# Patient Record
Sex: FEMALE | Race: BLACK OR AFRICAN AMERICAN | NOT HISPANIC OR LATINO | Employment: FULL TIME | URBAN - METROPOLITAN AREA
[De-identification: names, ages, dates, MRNs, and addresses within clinical notes are randomized per-mention and may not be internally consistent; named-entity substitution may affect disease eponyms.]

---

## 2021-04-06 ENCOUNTER — IMMUNIZATION (OUTPATIENT)
Dept: PRIMARY CARE CLINIC | Facility: CLINIC | Age: 31
End: 2021-04-06

## 2021-04-06 DIAGNOSIS — Z23 NEED FOR VACCINATION: Primary | ICD-10-CM

## 2021-04-06 PROCEDURE — 0001A COVID-19, MRNA, LNP-S, PF, 30 MCG/0.3 ML DOSE VACCINE: ICD-10-PCS | Mod: CV19,S$GLB,, | Performed by: FAMILY MEDICINE

## 2021-04-06 PROCEDURE — 0001A COVID-19, MRNA, LNP-S, PF, 30 MCG/0.3 ML DOSE VACCINE: CPT | Mod: CV19,S$GLB,, | Performed by: FAMILY MEDICINE

## 2021-04-06 PROCEDURE — 91300 COVID-19, MRNA, LNP-S, PF, 30 MCG/0.3 ML DOSE VACCINE: ICD-10-PCS | Mod: S$GLB,,, | Performed by: FAMILY MEDICINE

## 2021-04-06 PROCEDURE — 91300 COVID-19, MRNA, LNP-S, PF, 30 MCG/0.3 ML DOSE VACCINE: CPT | Mod: S$GLB,,, | Performed by: FAMILY MEDICINE

## 2021-04-27 ENCOUNTER — IMMUNIZATION (OUTPATIENT)
Dept: PRIMARY CARE CLINIC | Facility: CLINIC | Age: 31
End: 2021-04-27

## 2021-04-27 DIAGNOSIS — Z23 NEED FOR VACCINATION: Primary | ICD-10-CM

## 2021-04-27 PROCEDURE — 91300 COVID-19, MRNA, LNP-S, PF, 30 MCG/0.3 ML DOSE VACCINE: CPT | Mod: S$GLB,,, | Performed by: FAMILY MEDICINE

## 2021-04-27 PROCEDURE — 0002A COVID-19, MRNA, LNP-S, PF, 30 MCG/0.3 ML DOSE VACCINE: ICD-10-PCS | Mod: CV19,S$GLB,, | Performed by: FAMILY MEDICINE

## 2021-04-27 PROCEDURE — 0002A COVID-19, MRNA, LNP-S, PF, 30 MCG/0.3 ML DOSE VACCINE: CPT | Mod: CV19,S$GLB,, | Performed by: FAMILY MEDICINE

## 2021-04-27 PROCEDURE — 91300 COVID-19, MRNA, LNP-S, PF, 30 MCG/0.3 ML DOSE VACCINE: ICD-10-PCS | Mod: S$GLB,,, | Performed by: FAMILY MEDICINE

## 2022-08-06 ENCOUNTER — OFFICE VISIT (OUTPATIENT)
Dept: URGENT CARE | Facility: CLINIC | Age: 32
End: 2022-08-06
Payer: COMMERCIAL

## 2022-08-06 VITALS
WEIGHT: 293 LBS | TEMPERATURE: 101 F | HEART RATE: 102 BPM | DIASTOLIC BLOOD PRESSURE: 83 MMHG | SYSTOLIC BLOOD PRESSURE: 136 MMHG | RESPIRATION RATE: 16 BRPM | HEIGHT: 70 IN | OXYGEN SATURATION: 96 % | BODY MASS INDEX: 41.95 KG/M2

## 2022-08-06 DIAGNOSIS — Z20.2 STD EXPOSURE: ICD-10-CM

## 2022-08-06 DIAGNOSIS — Z20.822 SUSPECTED COVID-19 VIRUS INFECTION: ICD-10-CM

## 2022-08-06 DIAGNOSIS — J02.9 SORE THROAT: Primary | ICD-10-CM

## 2022-08-06 DIAGNOSIS — J01.90 ACUTE BACTERIAL SINUSITIS: ICD-10-CM

## 2022-08-06 DIAGNOSIS — J02.9 PHARYNGITIS, UNSPECIFIED ETIOLOGY: ICD-10-CM

## 2022-08-06 DIAGNOSIS — R51.9 SINUS HEADACHE: ICD-10-CM

## 2022-08-06 DIAGNOSIS — B96.89 ACUTE BACTERIAL SINUSITIS: ICD-10-CM

## 2022-08-06 LAB
CTP QC/QA: YES
CTP QC/QA: YES
MOLECULAR STREP A: NEGATIVE
SARS-COV-2 RDRP RESP QL NAA+PROBE: NEGATIVE

## 2022-08-06 PROCEDURE — 87651 POCT STREP A MOLECULAR: ICD-10-PCS | Mod: QW,S$GLB,, | Performed by: NURSE PRACTITIONER

## 2022-08-06 PROCEDURE — 87147 CULTURE TYPE IMMUNOLOGIC: CPT | Performed by: NURSE PRACTITIONER

## 2022-08-06 PROCEDURE — 87070 CULTURE OTHR SPECIMN AEROBIC: CPT | Performed by: NURSE PRACTITIONER

## 2022-08-06 PROCEDURE — U0002 COVID-19 LAB TEST NON-CDC: HCPCS | Mod: QW,S$GLB,, | Performed by: NURSE PRACTITIONER

## 2022-08-06 PROCEDURE — 99204 OFFICE O/P NEW MOD 45 MIN: CPT | Mod: S$GLB,,, | Performed by: NURSE PRACTITIONER

## 2022-08-06 PROCEDURE — 87651 STREP A DNA AMP PROBE: CPT | Mod: QW,S$GLB,, | Performed by: NURSE PRACTITIONER

## 2022-08-06 PROCEDURE — U0002: ICD-10-PCS | Mod: QW,S$GLB,, | Performed by: NURSE PRACTITIONER

## 2022-08-06 PROCEDURE — 99204 PR OFFICE/OUTPT VISIT, NEW, LEVL IV, 45-59 MIN: ICD-10-PCS | Mod: S$GLB,,, | Performed by: NURSE PRACTITIONER

## 2022-08-06 RX ORDER — FLUTICASONE PROPIONATE 50 MCG
2 SPRAY, SUSPENSION (ML) NASAL DAILY
Qty: 16 G | Refills: 1 | Status: SHIPPED | OUTPATIENT
Start: 2022-08-06 | End: 2022-08-06

## 2022-08-06 RX ORDER — BENZONATATE 100 MG/1
200 CAPSULE ORAL 3 TIMES DAILY PRN
Qty: 60 CAPSULE | Refills: 1 | Status: SHIPPED | OUTPATIENT
Start: 2022-08-06 | End: 2023-08-01

## 2022-08-06 RX ORDER — DOXYCYCLINE 100 MG/1
100 CAPSULE ORAL EVERY 12 HOURS
Qty: 20 CAPSULE | Refills: 0 | Status: SHIPPED | OUTPATIENT
Start: 2022-08-06 | End: 2022-08-06

## 2022-08-06 RX ORDER — FLUTICASONE PROPIONATE 50 MCG
2 SPRAY, SUSPENSION (ML) NASAL DAILY
Qty: 16 G | Refills: 1 | Status: SHIPPED | OUTPATIENT
Start: 2022-08-06 | End: 2023-08-01

## 2022-08-06 RX ORDER — DOXYCYCLINE 100 MG/1
100 CAPSULE ORAL EVERY 12 HOURS
Qty: 20 CAPSULE | Refills: 0 | Status: SHIPPED | OUTPATIENT
Start: 2022-08-06 | End: 2022-08-16

## 2022-08-06 RX ORDER — BENZONATATE 100 MG/1
200 CAPSULE ORAL 3 TIMES DAILY PRN
Qty: 60 CAPSULE | Refills: 1 | Status: SHIPPED | OUTPATIENT
Start: 2022-08-06 | End: 2022-08-06

## 2022-08-06 NOTE — PROGRESS NOTES
"Subjective:       Patient ID: Drew Livingston is a 31 y.o. female.    Vitals:  height is 5' 10" (1.778 m) and weight is 147.4 kg (325 lb) (abnormal). Her temperature is 101.2 °F (38.4 °C) (abnormal). Her blood pressure is 136/83 and her pulse is 102. Her respiration is 16 and oxygen saturation is 96%.     Chief Complaint: Sore Throat    Pt started with symptoms 08/05/22 sore throat,fever,fatigue,headaches Pt has not taken anything to treat symptoms.    Sore Throat   This is a new problem. The current episode started yesterday. The problem has been unchanged. The maximum temperature recorded prior to her arrival was 101 - 101.9 F. The pain is at a severity of 5/10. Associated symptoms include headaches, a hoarse voice and trouble swallowing. Pertinent negatives include no abdominal pain, congestion, coughing, diarrhea, drooling, ear discharge, ear pain, plugged ear sensation, neck pain, shortness of breath, stridor, swollen glands or vomiting. She has tried nothing for the symptoms. The treatment provided no relief.       HENT: Positive for sore throat and trouble swallowing. Negative for ear pain, ear discharge, drooling and congestion.    Neck: Negative for neck pain.   Respiratory: Negative for cough, shortness of breath and stridor.    Gastrointestinal: Negative for abdominal pain, vomiting and diarrhea.   Neurological: Positive for headaches.         CHIEF COMPLAINT/REASON FOR VISIT:  runny nose, nasal congestion, postnasal drip, fever with body aches     HISTORY OF PRESENT ILLNESS:   31  year-old  Obese female  complains of runny nose, nasal congestion,  Postnasal drip,fever with body aches,  headache onset 2-3 days.  Patient concerned regarding possible COVID and strep, requesting COVID and strep screen.  Patient concerned regarding possible STD exposure.  Patient admits had oral sex recently and wanting to be checked for an STD. Patient admits tried over-the-counter medications with no relief. Denies " chest pain, shortness of breath, dizziness, blurred vision, nausea, vomiting, diarrhea, loss of appetite.     History reviewed. No pertinent past medical history.      .History reviewed. No pertinent surgical history.      Social History     Socioeconomic History    Marital status: Single   Tobacco Use    Smoking status: Never Smoker    Smokeless tobacco: Never Used   Substance and Sexual Activity    Alcohol use: Not Currently    Drug use: Not Currently    Sexual activity: Not Currently       History reviewed. No pertinent family history.      ROS:  GENERAL: fever, chills with body aches  SKIN: No rashes, itching or changes in color or texture of skin.   HEENT:  Reports   Possible STD exposure, Sore throat, runny nose, nasal congestion, headache  NODES: No masses or lesions. Denies swollen glands.   CHEST: reports cough   CARDIOVASCULAR: Denies chest pain, shortness of breath.  ABDOMEN: Appetite fine. No weight loss. Denies diarrhea, abdominal pain  MUSCULOSKELETAL: No joint stiffness or swelling. Denies back pain.  NEUROLOGIC: No history of seizures, paralysis, alteration of gait or coordination.  PSYCHIATRIC: Denies mood swings, depression or suicidal thoughts.    PE:   APPEARANCE: Well nourished, well developed, in mild distress.  Temp 101.2°, pulse ox 96%  V/S: Reviewed.  SKIN: Normal skin turgor, no lesions.  HEENT: Turbinates injected, minimal red pharynx. TM's poor light reflex bilateral,  Minimal facial tenderness.  CHEST: Lungs clear to auscultation. No wheezing  CARDIOVASCULAR: Regular rate and rhythm.  NEUROLOGIC: No sensory deficits. Gait & Posture: Normal, No cerebellar signs.  MENTAL STATUS: Patient alert, oriented x 3 & conversant.    PLAN: Lab work POCT rapid Covid 19 screen/ negative, POCT  Rapid strep screen/ negative,  Culture, gonococcus,  Culture   Aerobic/ oral cavity  Advise increase p.o. fluids--water/juice & rest  Meds:  Doxycycline, Flonase and  Tessalon Perles  / no refills  Simply  saline nasal wash to irrigate sinuses and for congestion/runny nose.  Cool mist humidifier/vaporizer.  Practice good handwashing.  Advise use of green tea with honey  Tylenol or Ibuprofen for fever, headache and body aches.  Warm salt water gargles for throat comfort.  Chloraseptic spray or lozenges for throat comfort.  Advise follow up with PCP in 2-3 days for recheck  Advise go to ER if symptoms worsen or fail to improve with treatment.  Instructions, follow up, and supportive care as per AVS.  AVS provided and reviewed with patient including supportive care, follow up, and red flag symptoms.   Patient verbalizes understanding and agrees with treatment plan. Discharged from Urgent Care in stable condition.  You have tested negative for COVID-19 today. If you did not have any close exposure as defined below, then effective today, you can return to your normal daily activities including social distancing, wearing masks, and frequent handwashing.    DIAGNOSIS:   pharyngitis   sinus headache  Flu like symptoms   COVID-19 exposure   STD exposure   acute bacterial sinusitis

## 2022-08-06 NOTE — PATIENT INSTRUCTIONS
PLAN: Lab work POCT rapid Covid 19 screen/ negative, POCT  Rapid strep screen/ negative,  Culture, gonococcus  Advise increase p.o. fluids--water/juice & rest  Meds:  Doxycycline, Flonase and  Tessalon Perles  / no refills  Simply saline nasal wash to irrigate sinuses and for congestion/runny nose.  Cool mist humidifier/vaporizer.  Practice good handwashing.  Advise use of green tea with honey  Tylenol or Ibuprofen for fever, headache and body aches.  Warm salt water gargles for throat comfort.  Chloraseptic spray or lozenges for throat comfort.  Advise follow up with PCP in 2-3 days for recheck  Advise go to ER if symptoms worsen or fail to improve with treatment.  Instructions, follow up, and supportive care as per AVS.  AVS provided and reviewed with patient including supportive care, follow up, and red flag symptoms.   Patient verbalizes understanding and agrees with treatment plan. Discharged from Urgent Care in stable condition.  You have tested negative for COVID-19 today. If you did not have any close exposure as defined below, then effective today, you can return to your normal daily activities including social distancing, wearing masks, and frequent handwashing.

## 2022-08-09 LAB — BACTERIA SPEC AEROBE CULT: ABNORMAL

## 2022-08-10 ENCOUNTER — TELEPHONE (OUTPATIENT)
Dept: URGENT CARE | Facility: CLINIC | Age: 32
End: 2022-08-10
Payer: COMMERCIAL

## 2022-08-10 DIAGNOSIS — A49.1 GROUP C STREPTOCOCCAL INFECTION: Primary | ICD-10-CM

## 2022-08-10 RX ORDER — CEFDINIR 300 MG/1
300 CAPSULE ORAL 2 TIMES DAILY
Qty: 14 CAPSULE | Refills: 0 | Status: SHIPPED | OUTPATIENT
Start: 2022-08-10 | End: 2022-08-10

## 2022-08-10 RX ORDER — CEFDINIR 300 MG/1
300 CAPSULE ORAL 2 TIMES DAILY
Qty: 14 CAPSULE | Refills: 0 | Status: SHIPPED | OUTPATIENT
Start: 2022-08-10 | End: 2022-08-17
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
## 2023-02-04 ENCOUNTER — OFFICE VISIT (OUTPATIENT)
Dept: URGENT CARE | Facility: CLINIC | Age: 33
End: 2023-02-04
Payer: COMMERCIAL

## 2023-02-04 VITALS
TEMPERATURE: 98 F | HEIGHT: 70 IN | WEIGHT: 293 LBS | DIASTOLIC BLOOD PRESSURE: 74 MMHG | SYSTOLIC BLOOD PRESSURE: 134 MMHG | OXYGEN SATURATION: 99 % | BODY MASS INDEX: 41.95 KG/M2 | HEART RATE: 85 BPM

## 2023-02-04 DIAGNOSIS — K64.4 EXTERNAL HEMORRHOIDS: Primary | ICD-10-CM

## 2023-02-04 PROCEDURE — 3078F DIAST BP <80 MM HG: CPT | Mod: CPTII,S$GLB,,

## 2023-02-04 PROCEDURE — 1160F RVW MEDS BY RX/DR IN RCRD: CPT | Mod: CPTII,S$GLB,,

## 2023-02-04 PROCEDURE — 3078F PR MOST RECENT DIASTOLIC BLOOD PRESSURE < 80 MM HG: ICD-10-PCS | Mod: CPTII,S$GLB,,

## 2023-02-04 PROCEDURE — 99203 PR OFFICE/OUTPT VISIT, NEW, LEVL III, 30-44 MIN: ICD-10-PCS | Mod: S$GLB,,,

## 2023-02-04 PROCEDURE — 1159F PR MEDICATION LIST DOCUMENTED IN MEDICAL RECORD: ICD-10-PCS | Mod: CPTII,S$GLB,,

## 2023-02-04 PROCEDURE — 99203 OFFICE O/P NEW LOW 30 MIN: CPT | Mod: S$GLB,,,

## 2023-02-04 PROCEDURE — 3008F PR BODY MASS INDEX (BMI) DOCUMENTED: ICD-10-PCS | Mod: CPTII,S$GLB,,

## 2023-02-04 PROCEDURE — 3075F PR MOST RECENT SYSTOLIC BLOOD PRESS GE 130-139MM HG: ICD-10-PCS | Mod: CPTII,S$GLB,,

## 2023-02-04 PROCEDURE — 3008F BODY MASS INDEX DOCD: CPT | Mod: CPTII,S$GLB,,

## 2023-02-04 PROCEDURE — 1159F MED LIST DOCD IN RCRD: CPT | Mod: CPTII,S$GLB,,

## 2023-02-04 PROCEDURE — 3075F SYST BP GE 130 - 139MM HG: CPT | Mod: CPTII,S$GLB,,

## 2023-02-04 PROCEDURE — 1160F PR REVIEW ALL MEDS BY PRESCRIBER/CLIN PHARMACIST DOCUMENTED: ICD-10-PCS | Mod: CPTII,S$GLB,,

## 2023-02-04 RX ORDER — ESTRADIOL 0.5 MG/1
0.5 TABLET ORAL DAILY
COMMUNITY

## 2023-02-04 RX ORDER — LIDOCAINE HYDROCHLORIDE AND HYDROCORTISONE ACETATE 30; 5 MG/G; MG/G
1 CREAM TOPICAL 2 TIMES DAILY PRN
Qty: 28.3 G | Refills: 0 | Status: CANCELLED | OUTPATIENT
Start: 2023-02-04 | End: 2023-02-11

## 2023-02-04 RX ORDER — HYDROCORTISONE 1 %
CREAM (GRAM) TOPICAL 2 TIMES DAILY
Qty: 26 G | Refills: 0 | Status: SHIPPED | OUTPATIENT
Start: 2023-02-04 | End: 2023-02-04

## 2023-02-04 RX ORDER — SPIRONOLACTONE 50 MG/1
50 TABLET, FILM COATED ORAL DAILY
COMMUNITY

## 2023-02-04 RX ORDER — LIDOCAINE HYDROCHLORIDE AND HYDROCORTISONE ACETATE 30; 5 MG/G; MG/G
1 CREAM TOPICAL 2 TIMES DAILY PRN
Qty: 28.3 G | Refills: 0 | Status: SHIPPED | OUTPATIENT
Start: 2023-02-04 | End: 2023-02-11

## 2023-02-04 NOTE — PATIENT INSTRUCTIONS
Do not use prescribed gel for more than 7 days.  Sitz baths as needed.  Be aware of bleeding.   Increase fiber in diet to loosen stools.  Drink plenty of fluids.  You must understand that you have received Urgent Care treatment only and that you may be released before all of your medical problems are known or treated.   Return to Urgent Care or go to ER if symptoms worsen or fail to improve.  Follow up with PCP as recommended for further management.   We will notify you of any test results (if applicable) in 3-5 days.

## 2023-02-04 NOTE — PROGRESS NOTES
"Subjective:       Patient ID: Drew Livingston is a 32 y.o. female.    Chief Complaint:  Anal Itching      HPI:      Outpatient Medications Marked as Taking for the 2/4/23 encounter (Office Visit) with PROVIDER, URGENT CARE, [facility]   Medication Sig Dispense Refill    estradioL (ESTRACE) 0.5 MG tablet Take 0.5 mg by mouth once daily.      spironolactone (ALDACTONE) 50 MG tablet Take 50 mg by mouth once daily.          Patient has no known allergies.     History reviewed. No pertinent past medical history.    History reviewed. No pertinent family history.    Environmental History: {environmental history:[id number]}     Review of Systems    Objective:     Visit Vitals  /74 (BP Location: Left arm, Patient Position: Sitting)   Pulse 85   Temp 98.2 °F (36.8 °C)   Ht 5' 10" (1.778 m)   Wt (!) 146.1 kg (322 lb)   SpO2 99%   BMI 46.20 kg/m²       Physical Exam      Assessment:      No diagnosis found.    Plan:         "

## 2023-02-04 NOTE — PROGRESS NOTES
"Subjective:       Patient ID: Drew Livingston is a 32 y.o. female.    Vitals:  height is 5' 10" (1.778 m) and weight is 146.1 kg (322 lb) (abnormal). Her temperature is 98.2 °F (36.8 °C). Her blood pressure is 134/74 and her pulse is 85. Her oxygen saturation is 99%.     Chief Complaint: Anal Itching    Drew Livingston is a 32 y.o. female who presents for anal itching which onset 3 weeks ago. No associated sxs included. She denies any pain. Denies any mitigating or exacerbating sxs. Symptoms are not worse at night. No rectal bleeding. No hematochezia. Patient denies any fever, chills, SOB, CP, abd pain, n/v/d, rash, dizziness, or numbness/tingling.    Other  This is a new problem. The current episode started 1 to 4 weeks ago. The problem occurs 2 to 4 times per day. The problem has been unchanged. Pertinent negatives include no abdominal pain, anorexia, arthralgias, change in bowel habit, chest pain, chills, congestion, coughing, diaphoresis, fatigue, fever, headaches, joint swelling, myalgias, nausea, neck pain, numbness, rash, sore throat, swollen glands, urinary symptoms, vertigo, visual change, vomiting or weakness. Nothing aggravates the symptoms. She has tried nothing for the symptoms.     Constitution: Negative for chills, sweating, fatigue and fever.   HENT:  Negative for congestion and sore throat.    Neck: Negative for neck pain.   Cardiovascular:  Negative for chest pain.   Respiratory:  Negative for cough.    Gastrointestinal:  Negative for abdominal pain, nausea, vomiting, constipation, bright red blood in stool, rectal bleeding, rectal pain and hemorrhoids.        (+) rectal itching   Musculoskeletal:  Negative for joint pain, joint swelling and muscle ache.   Skin:  Negative for rash.   Neurological:  Negative for history of vertigo, headaches and numbness.     Objective:      Physical Exam   Pulmonary/Chest: Effort normal.   Abdominal: Normal appearance. flat abdomen   Genitourinary:          "      Comments: 2 external non-thrombosed hemorrhoids at the anterior midline and 10 o'clock position. No fissures noted. No verrucous places. No vesicles. No discharge. No bleeding.      Musculoskeletal: Normal range of motion.         General: Normal range of motion.   Neurological: no focal deficit. She is alert.   Skin: Skin is warm and dry.   Psychiatric: Mood and thought content normal.   Nursing note and vitals reviewed.chaperone present         Assessment:       1. External hemorrhoids          Plan:         External hemorrhoids  -     LIDOcaine HCl-hydrocortison ac (LIDAMANTLE-HC) 3-0.5 % topical cream; Apply 1 g topically 2 (two) times daily as needed (for itching/pain).  Dispense: 28.3 g; Refill: 0    Non-thrombosed external hemorrhoids.  Afebrile. VSS.  Meds: lidocaine/hydrocortisone with aloe topical cream sent to preferred pharmacy. Instructed patient to not use prescribed medication for more than 7 days.  Sitz baths as needed.  Be aware of any bleeding.   Increase fiber in diet to soften stools.  Increase fluid intake.  RTC for any worsening symptoms.

## 2023-04-04 ENCOUNTER — HOSPITAL ENCOUNTER (EMERGENCY)
Facility: HOSPITAL | Age: 33
Discharge: HOME | End: 2023-04-04
Attending: EMERGENCY MEDICINE
Payer: COMMERCIAL

## 2023-04-04 VITALS
OXYGEN SATURATION: 98 % | WEIGHT: 293 LBS | TEMPERATURE: 98 F | HEIGHT: 70 IN | SYSTOLIC BLOOD PRESSURE: 142 MMHG | DIASTOLIC BLOOD PRESSURE: 88 MMHG | HEART RATE: 74 BPM | BODY MASS INDEX: 41.95 KG/M2 | RESPIRATION RATE: 16 BRPM

## 2023-04-04 DIAGNOSIS — Z71.89 ENCOUNTER FOR HIV SCREENING AND DISCUSSION OF PRE-EXPOSURE PROPHYLAXIS FOR HIV: ICD-10-CM

## 2023-04-04 DIAGNOSIS — Z11.4 ENCOUNTER FOR HIV SCREENING AND DISCUSSION OF PRE-EXPOSURE PROPHYLAXIS FOR HIV: ICD-10-CM

## 2023-04-04 DIAGNOSIS — Z20.2 ENCOUNTER FOR ASSESSMENT OF STD EXPOSURE: ICD-10-CM

## 2023-04-04 DIAGNOSIS — T74.21XA SEXUAL ASSAULT OF ADULT, INITIAL ENCOUNTER: Primary | ICD-10-CM

## 2023-04-04 LAB
BILIRUB UR QL STRIP: NEGATIVE
CLARITY UR: CLEAR
COLOR UR: YELLOW
GLUCOSE UR QL STRIP: NEGATIVE
HCV AB SERPL QL IA: NEGATIVE
HEP C VIRUS HOLD SPECIMEN: NORMAL
HGB UR QL STRIP: NEGATIVE
HIV 1+2 AB+HIV1 P24 AG SERPL QL IA: NEGATIVE
KETONES UR QL STRIP: NEGATIVE
LEUKOCYTE ESTERASE UR QL STRIP: NEGATIVE
NITRITE UR QL STRIP: NEGATIVE
PH UR STRIP: 7 [PH] (ref 5–8)
PROT UR QL STRIP: NEGATIVE
SP GR UR STRIP: 1.02 (ref 1–1.03)
URN SPEC COLLECT METH UR: NORMAL
UROBILINOGEN UR STRIP-ACNC: NEGATIVE EU/DL

## 2023-04-04 PROCEDURE — 87591 N.GONORRHOEAE DNA AMP PROB: CPT | Performed by: EMERGENCY MEDICINE

## 2023-04-04 PROCEDURE — 81003 URINALYSIS AUTO W/O SCOPE: CPT | Performed by: EMERGENCY MEDICINE

## 2023-04-04 PROCEDURE — 87389 HIV-1 AG W/HIV-1&-2 AB AG IA: CPT | Performed by: EMERGENCY MEDICINE

## 2023-04-04 PROCEDURE — 63600175 PHARM REV CODE 636 W HCPCS: Performed by: EMERGENCY MEDICINE

## 2023-04-04 PROCEDURE — 99284 EMERGENCY DEPT VISIT MOD MDM: CPT

## 2023-04-04 PROCEDURE — 96372 THER/PROPH/DIAG INJ SC/IM: CPT | Performed by: EMERGENCY MEDICINE

## 2023-04-04 PROCEDURE — 86803 HEPATITIS C AB TEST: CPT | Performed by: EMERGENCY MEDICINE

## 2023-04-04 RX ORDER — DOXYCYCLINE 100 MG/1
100 CAPSULE ORAL 2 TIMES DAILY
Qty: 14 CAPSULE | Refills: 0 | Status: SHIPPED | OUTPATIENT
Start: 2023-04-04 | End: 2023-04-04 | Stop reason: SDUPTHER

## 2023-04-04 RX ORDER — EMTRICITABINE AND TENOFOVIR DISOPROXIL FUMARATE 200; 300 MG/1; MG/1
1 TABLET, FILM COATED ORAL DAILY
Qty: 30 TABLET | Refills: 0 | Status: SHIPPED | OUTPATIENT
Start: 2023-04-04 | End: 2023-08-01

## 2023-04-04 RX ORDER — DOXYCYCLINE 100 MG/1
100 CAPSULE ORAL 2 TIMES DAILY
Qty: 14 CAPSULE | Refills: 0 | Status: SHIPPED | OUTPATIENT
Start: 2023-04-04 | End: 2023-04-11

## 2023-04-04 RX ORDER — METRONIDAZOLE 500 MG/1
500 TABLET ORAL EVERY 12 HOURS
Qty: 14 TABLET | Refills: 0 | Status: SHIPPED | OUTPATIENT
Start: 2023-04-04 | End: 2023-04-11

## 2023-04-04 RX ORDER — METRONIDAZOLE 500 MG/1
500 TABLET ORAL EVERY 12 HOURS
Qty: 14 TABLET | Refills: 0 | Status: SHIPPED | OUTPATIENT
Start: 2023-04-04 | End: 2023-04-04 | Stop reason: SDUPTHER

## 2023-04-04 RX ORDER — CEFTRIAXONE 500 MG/1
500 INJECTION, POWDER, FOR SOLUTION INTRAMUSCULAR; INTRAVENOUS
Status: COMPLETED | OUTPATIENT
Start: 2023-04-04 | End: 2023-04-04

## 2023-04-04 RX ORDER — EMTRICITABINE AND TENOFOVIR DISOPROXIL FUMARATE 200; 300 MG/1; MG/1
1 TABLET, FILM COATED ORAL DAILY
Qty: 30 TABLET | Refills: 0 | Status: SHIPPED | OUTPATIENT
Start: 2023-04-04 | End: 2023-04-04 | Stop reason: SDUPTHER

## 2023-04-04 RX ADMIN — CEFTRIAXONE SODIUM 500 MG: 500 INJECTION, POWDER, FOR SOLUTION INTRAMUSCULAR; INTRAVENOUS at 01:04
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
## 2023-04-05 LAB
C TRACH DNA SPEC QL NAA+PROBE: NOT DETECTED
N GONORRHOEA DNA SPEC QL NAA+PROBE: NOT DETECTED

## 2023-04-24 ENCOUNTER — NURSE TRIAGE (OUTPATIENT)
Dept: ADMINISTRATIVE | Facility: CLINIC | Age: 33
End: 2023-04-24
Payer: COMMERCIAL

## 2023-04-24 NOTE — TELEPHONE ENCOUNTER
Pt took Vit D 50,000 IU  on Saturday then took another dose today. Medication is suppose to be taken weekly. PT stated it was prescribed by Md at [facility]. Pt denies any other symptoms. Care advice recommend call is transferred to pcp. Pt offered and accepted [facility].   Reason for Disposition   DOUBLE DOSE (an extra dose or lesser amount) of prescription drug and NO symptoms  (Exception: A double dose of antibiotics.)    Additional Information   Negative: Intentional drug overdose and suicidal thoughts or ideas   Negative: MORE THAN A DOUBLE DOSE of a prescription or over-the-counter (OTC) drug   Negative: DOUBLE DOSE (an extra dose or lesser amount) of prescription drug and any symptoms (e.g., dizziness, nausea, pain, sleepiness)   Negative: DOUBLE DOSE (an extra dose or lesser amount) of over-the-counter (OTC) drug and any symptoms (e.g., dizziness, nausea, pain, sleepiness)   Negative: Took another person's prescription drug    Protocols used: Medication Question Call-A-OH

## 2023-06-21 ENCOUNTER — OFFICE VISIT (OUTPATIENT)
Dept: URGENT CARE | Facility: CLINIC | Age: 33
End: 2023-06-21
Payer: COMMERCIAL

## 2023-06-21 VITALS
DIASTOLIC BLOOD PRESSURE: 62 MMHG | HEART RATE: 68 BPM | TEMPERATURE: 99 F | RESPIRATION RATE: 18 BRPM | OXYGEN SATURATION: 99 % | WEIGHT: 293 LBS | SYSTOLIC BLOOD PRESSURE: 128 MMHG | BODY MASS INDEX: 41.95 KG/M2 | HEIGHT: 70 IN

## 2023-06-21 DIAGNOSIS — B37.2 CANDIDAL SKIN INFECTION: Primary | ICD-10-CM

## 2023-06-21 PROCEDURE — 99213 PR OFFICE/OUTPT VISIT, EST, LEVL III, 20-29 MIN: ICD-10-PCS | Mod: S$GLB,,, | Performed by: NURSE PRACTITIONER

## 2023-06-21 PROCEDURE — 99213 OFFICE O/P EST LOW 20 MIN: CPT | Mod: S$GLB,,, | Performed by: NURSE PRACTITIONER

## 2023-06-21 RX ORDER — FLUCONAZOLE 150 MG/1
150 TABLET ORAL DAILY
Qty: 1 TABLET | Refills: 0 | Status: SHIPPED | OUTPATIENT
Start: 2023-06-21 | End: 2023-06-22

## 2023-06-21 RX ORDER — CLOTRIMAZOLE 1 %
CREAM (GRAM) TOPICAL 2 TIMES DAILY
Qty: 28 G | Refills: 0 | Status: SHIPPED | OUTPATIENT
Start: 2023-06-21 | End: 2023-08-30
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
## 2023-06-21 NOTE — PATIENT INSTRUCTIONS
Apply topical antifungal as prescribed.  Take diflucan and prescribed.  Continue to use good hygiene practices.  Return to clinic or follow up with primary care provider for worsening of any symptoms.

## 2023-06-24 ENCOUNTER — TELEPHONE (OUTPATIENT)
Dept: URGENT CARE | Facility: CLINIC | Age: 33
End: 2023-06-24
Payer: COMMERCIAL

## 2023-08-01 ENCOUNTER — LAB VISIT (OUTPATIENT)
Dept: LAB | Facility: HOSPITAL | Age: 33
End: 2023-08-01
Attending: NURSE PRACTITIONER
Payer: COMMERCIAL

## 2023-08-01 ENCOUNTER — OFFICE VISIT (OUTPATIENT)
Dept: URGENT CARE | Facility: CLINIC | Age: 33
End: 2023-08-01
Payer: COMMERCIAL

## 2023-08-01 VITALS
WEIGHT: 293 LBS | HEIGHT: 70 IN | RESPIRATION RATE: 18 BRPM | DIASTOLIC BLOOD PRESSURE: 79 MMHG | TEMPERATURE: 99 F | HEART RATE: 94 BPM | SYSTOLIC BLOOD PRESSURE: 149 MMHG | OXYGEN SATURATION: 100 % | BODY MASS INDEX: 41.95 KG/M2

## 2023-08-01 DIAGNOSIS — K13.21 LEUKOPLAKIA OF TONGUE: ICD-10-CM

## 2023-08-01 DIAGNOSIS — K13.21 LEUKOPLAKIA OF TONGUE: Primary | ICD-10-CM

## 2023-08-01 DIAGNOSIS — Z11.3 SCREENING FOR STD (SEXUALLY TRANSMITTED DISEASE): ICD-10-CM

## 2023-08-01 DIAGNOSIS — B37.0 ORAL THRUSH: ICD-10-CM

## 2023-08-01 PROCEDURE — 99214 PR OFFICE/OUTPT VISIT, EST, LEVL IV, 30-39 MIN: ICD-10-PCS | Mod: S$GLB,,, | Performed by: NURSE PRACTITIONER

## 2023-08-01 PROCEDURE — 86592 SYPHILIS TEST NON-TREP QUAL: CPT | Performed by: NURSE PRACTITIONER

## 2023-08-01 PROCEDURE — 87389 HIV-1 AG W/HIV-1&-2 AB AG IA: CPT | Performed by: NURSE PRACTITIONER

## 2023-08-01 PROCEDURE — 36415 COLL VENOUS BLD VENIPUNCTURE: CPT | Mod: PO | Performed by: NURSE PRACTITIONER

## 2023-08-01 PROCEDURE — 99214 OFFICE O/P EST MOD 30 MIN: CPT | Mod: S$GLB,,, | Performed by: NURSE PRACTITIONER

## 2023-08-01 RX ORDER — NYSTATIN 100000 [USP'U]/ML
5 SUSPENSION ORAL 4 TIMES DAILY
Qty: 160 ML | Refills: 0 | Status: SHIPPED | OUTPATIENT
Start: 2023-08-01 | End: 2023-08-15

## 2023-08-01 NOTE — PROGRESS NOTES
"Subjective:      Patient ID: Drew Livingston is a 32 y.o. female.    Vitals:  height is 5' 10" (1.778 m) and weight is 135.2 kg (298 lb). Her blood pressure is 149/79 (abnormal) and her pulse is 94. Her respiration is 18 and oxygen saturation is 100%.     Chief Complaint: Lip Laceration    C/o a bump on tongue, 1 week, Pt states she used a new toothpaste recently, pt states the symptoms has happened before but went away    Other  This is a new problem. The current episode started in the past 7 days. The problem occurs constantly. The problem has been unchanged. Pertinent negatives include no abdominal pain, anorexia, arthralgias, change in bowel habit, chest pain, chills, congestion, coughing, diaphoresis, fatigue, headaches, joint swelling, myalgias, nausea, neck pain, numbness, rash, sore throat, swollen glands, urinary symptoms, vertigo, visual change, vomiting or weakness. Nothing aggravates the symptoms. She has tried nothing for the symptoms. The treatment provided no relief.     Constitution: Negative for chills, sweating and fatigue.   HENT:  Negative for congestion and sore throat.    Neck: Negative for neck pain.   Cardiovascular:  Negative for chest pain.   Respiratory:  Negative for cough.    Gastrointestinal:  Negative for abdominal pain, nausea and vomiting.   Musculoskeletal:  Negative for joint pain, joint swelling and muscle ache.   Skin:  Negative for rash.   Neurological:  Negative for history of vertigo, headaches and numbness.    Objective:     Physical Exam    Assessment:     No diagnosis found.    Plan:       There are no diagnoses linked to this encounter.                "

## 2023-08-01 NOTE — PROGRESS NOTES
"Subjective:      Patient ID: Drew Livingston is a 32 y.o. female.    Vitals:  height is 5' 10" (1.778 m) and weight is 135.2 kg (298 lb). Her tympanic temperature is 99.1 °F (37.3 °C). Her blood pressure is 149/79 (abnormal) and her pulse is 94. Her respiration is 18 and oxygen saturation is 100%.     Chief Complaint: Bump on Lip    Drew Livingston is a 32 year old female whom presents to urgent care with complaints of  a "bump" on her tongue. Patient reports the lesion has been present for approximately 2 week. Patient reports it is not painful but is causing a great deal of concern. Patient reports the only new thing she has tried is a new toothpaste but reports there is no correlation between the time the toothpaste was started and appearance of the lesion. Patient states this has happened previously in the past but reports the lesion went away on its own without any treatment.  Patient reports prior dental work approximately one month ago. Patient denies any family history of oral cancer. Patient does report increased life stressors at this time.     Other  This is a new problem. The current episode started in the past 7 days. The problem occurs constantly. The problem has been unchanged. Pertinent negatives include no abdominal pain, anorexia, arthralgias, change in bowel habit, chest pain, chills, congestion, coughing, diaphoresis, fatigue, headaches, joint swelling, myalgias, nausea, neck pain, numbness, rash, sore throat, swollen glands, urinary symptoms, vertigo, visual change, vomiting or weakness. Nothing aggravates the symptoms. She has tried nothing for the symptoms. The treatment provided no relief.       Constitution: Negative for chills, sweating and fatigue.   HENT:  Negative for congestion and sore throat.    Neck: Negative for neck pain.   Cardiovascular:  Negative for chest pain.   Respiratory:  Negative for cough.    Gastrointestinal:  Negative for abdominal pain, nausea and vomiting. "   Musculoskeletal:  Negative for joint pain, joint swelling and muscle ache.   Skin:  Negative for rash.   Neurological:  Negative for history of vertigo, headaches and numbness.      Objective:     Physical Exam   HENT:   Head: Normocephalic and atraumatic.   Ears:   Right Ear: External ear normal.   Left Ear: External ear normal.   Nose: Nose normal.   Mouth/Throat: Uvula is midline. Mucous membranes are moist. Oropharynx is clear.       Neck: Neck supple.   Abdominal: Normal appearance.   Neurological: She is alert.     Admission on 04/04/2023, Discharged on 04/04/2023   Component Date Value Ref Range Status    HIV 1/2 Ag/Ab 04/04/2023 Negative  Negative Final    Hepatitis C Ab 04/04/2023 Negative  Negative Final    HEP C Virus Hold Specimen 04/04/2023 Hold for HCV sendout   Final    Specimen UA 04/04/2023 Urine, Clean Catch   Final    Color, UA 04/04/2023 Yellow  Yellow, Straw, [place] Final    Appearance, UA 04/04/2023 Clear  Clear Final    pH, UA 04/04/2023 7.0  5.0 - 8.0 Final    Specific Gravity, UA 04/04/2023 1.020  1.005 - 1.030 Final    Protein, UA 04/04/2023 Negative  Negative Final    Comment: Recommend a 24 hour urine protein or a urine   protein/creatinine ratio if globulin induced proteinuria is  clinically suspected.      Glucose, UA 04/04/2023 Negative  Negative Final    Ketones, UA 04/04/2023 Negative  Negative Final    Bilirubin (UA) 04/04/2023 Negative  Negative Final    Occult Blood UA 04/04/2023 Negative  Negative Final    Nitrite, UA 04/04/2023 Negative  Negative Final    Urobilinogen, UA 04/04/2023 Negative  <2.0 EU/dL Final    Leukocytes, UA 04/04/2023 Negative  Negative Final    Chlamydia, Amplified DNA 04/04/2023 Not Detected  Not Detected Final    N gonorrhoeae, amplified DNA 04/04/2023 Not Detected  Not Detected Final       Assessment:     1. Leukoplakia of tongue    2. Oral thrush    3. Screening for STD (sexually transmitted disease)        Plan:       Leukoplakia of tongue  -      Ambulatory referral/consult to ENT  -     Cancel: HIV 1/2 Ag/Ab (4th Gen); Future; Expected date: 08/01/2023  -     Cancel: RPR; Future; Expected date: 08/01/2023  -     HIV 1/2 Ag/Ab (4th Gen); Future; Expected date: 08/01/2023  -     RPR; Future; Expected date: 08/01/2023    Oral thrush  -     nystatin (MYCOSTATIN) 100,000 unit/mL suspension; Take 5 mLs (500,000 Units total) by mouth 4 (four) times daily. Swish and swallow over 20 minutes for 14 days  Dispense: 160 mL; Refill: 0  -     Ambulatory referral/consult to ENT    Screening for STD (sexually transmitted disease)  -     Cancel: HIV 1/2 Ag/Ab (4th Gen); Future; Expected date: 08/01/2023  -     Cancel: RPR; Future; Expected date: 08/01/2023  -     HIV 1/2 Ag/Ab (4th Gen); Future; Expected date: 08/01/2023  -     RPR; Future; Expected date: 08/01/2023          Medical Decision Making:   Differential Diagnosis:   Leukoplakia, hairy leukoplakia, malignancy,  frictional keratosis, lichen planus, discoid lupus erythematosus, submucous fibrosis, candidiasis  Urgent Care Management:  Previous encounters and labs were independently reviewed. Discussed with patient  all pertinent information and results. Unable to obtain blood sample in clinic today, patient will report to the [facility] central location for lab collection. Discussed patient diagnosis and plan of treatment. Additional plan of care as outlined above. Patient  was given all follow up and return instructions. All questions and concerns were addressed at this time. Patient  expresses understanding of information and instructions, and is comfortable with plan.    Patient was instructed to follow up with his Primary Care Provider or otolaryngologist if no improvement in symptoms in 5-7 DAYS:  or go to ED immediately for any worsening or change in current symptoms. Patient verbalized understanding and agrees with the discussed plan of care. Patient remained stable throughout the visit and exited the exam room  in NAD.          Patient Instructions   A referral has been placed for you to follow up with the otolaryngologist. Someone should be contacting you soon to set up that appointment. However, you may call [phone] at anytime to schedule this follow up appointment.       Please arrange follow up with your primary medical clinic as soon as possible. You must understand that you've received an Urgent Care treatment only and that you may be released before all of your medical problems are known or treated. You, the patient, will arrange for follow up as instructed. If your symptoms worsen or fail to improve you should go to the Emergency Room.

## 2023-08-01 NOTE — PATIENT INSTRUCTIONS
A referral has been placed for you to follow up with the otolaryngologist. Someone should be contacting you soon to set up that appointment. However, you may call [phone] at anytime to schedule this follow up appointment.       Please arrange follow up with your primary medical clinic as soon as possible. You must understand that you've received an Urgent Care treatment only and that you may be released before all of your medical problems are known or treated. You, the patient, will arrange for follow up as instructed. If your symptoms worsen or fail to improve you should go to the Emergency Room.

## 2023-08-02 ENCOUNTER — TELEPHONE (OUTPATIENT)
Dept: URGENT CARE | Facility: CLINIC | Age: 33
End: 2023-08-02
Payer: COMMERCIAL

## 2023-08-02 LAB
HIV 1+2 AB+HIV1 P24 AG SERPL QL IA: NORMAL
RPR SER QL: NORMAL

## 2023-08-03 NOTE — TELEPHONE ENCOUNTER
Results for orders placed or performed in visit on 08/01/23   HIV 1/2 Ag/Ab (4th Gen)   Result Value Ref Range    HIV 1/2 Ag/Ab Non-reactive Non-reactive   RPR   Result Value Ref Range    RPR Non-reactive Non-reactive     Patient had already seen results through portal and had no further questions or concerns about screening results.

## 2023-08-04 ENCOUNTER — TELEPHONE (OUTPATIENT)
Dept: URGENT CARE | Facility: CLINIC | Age: 33
End: 2023-08-04
Payer: COMMERCIAL

## 2023-08-04 NOTE — TELEPHONE ENCOUNTER
Courtesy call made.  Spoke with Patient(Self) and she states that she is doing well.  She states that the medication is working and that she has appointment with ENT on 8/8.  Advised to keep appointment and follow up with ENT.

## 2023-08-30 ENCOUNTER — OFFICE VISIT (OUTPATIENT)
Dept: OTOLARYNGOLOGY | Facility: CLINIC | Age: 33
End: 2023-08-30
Payer: COMMERCIAL

## 2023-08-30 VITALS — BODY MASS INDEX: 41.95 KG/M2 | WEIGHT: 293 LBS | HEIGHT: 70 IN

## 2023-08-30 DIAGNOSIS — Z87.19 HISTORY OF ORAL APHTHOUS ULCERS: Primary | ICD-10-CM

## 2023-08-30 PROCEDURE — 1159F MED LIST DOCD IN RCRD: CPT | Mod: CPTII,S$GLB,, | Performed by: OTOLARYNGOLOGY

## 2023-08-30 PROCEDURE — 99999 PR PBB SHADOW E&M-EST. PATIENT-LVL II: ICD-10-PCS | Mod: PBBFAC,,, | Performed by: OTOLARYNGOLOGY

## 2023-08-30 PROCEDURE — 99202 OFFICE O/P NEW SF 15 MIN: CPT | Mod: S$GLB,,, | Performed by: OTOLARYNGOLOGY

## 2023-08-30 PROCEDURE — 1159F PR MEDICATION LIST DOCUMENTED IN MEDICAL RECORD: ICD-10-PCS | Mod: CPTII,S$GLB,, | Performed by: OTOLARYNGOLOGY

## 2023-08-30 PROCEDURE — 99999 PR PBB SHADOW E&M-EST. PATIENT-LVL II: CPT | Mod: PBBFAC,,, | Performed by: OTOLARYNGOLOGY

## 2023-08-30 PROCEDURE — 3008F PR BODY MASS INDEX (BMI) DOCUMENTED: ICD-10-PCS | Mod: CPTII,S$GLB,, | Performed by: OTOLARYNGOLOGY

## 2023-08-30 PROCEDURE — 3008F BODY MASS INDEX DOCD: CPT | Mod: CPTII,S$GLB,, | Performed by: OTOLARYNGOLOGY

## 2023-08-30 PROCEDURE — 99202 PR OFFICE/OUTPT VISIT, NEW, LEVL II, 15-29 MIN: ICD-10-PCS | Mod: S$GLB,,, | Performed by: OTOLARYNGOLOGY

## 2023-08-30 NOTE — PROGRESS NOTES
"Referring Provider:    Ottoniel Smith Np  [address]  [address]  [place] [address]  Subjective:   Patient: Drew Livingston [id number], :1990   Visit date:2023 10:34 AM    Chief Complaint:  Oral Pain (Pt states for 2 weeks she had a mouth ulcer went to  and got an oral rinse and ulcer resolved Pt states she has had the same issue a few years ago as it comes and goes but it was worse this episode )    HPI:    Prior notes reviewed by myself.  Clinical documentation obtained by nursing staff reviewed.     32-year-old female presents for evaluation of recent oral ulceration.  She states that she had a sore area or bump near the back of her tongue that lasted for 2-3 weeks.  Eventually this did resolve with an unknown medication from an urgent care.  Today she is asymptomatic and feels as if her issue is completely resolved.  She did have a previous similar episode 3 years ago which resolved spontaneously.      Objective:     Physical Exam:  Vitals:  Ht 5' 10" (1.778 m)   Wt (!) 141.7 kg (312 lb 6.3 oz)   BMI 44.82 kg/m²   General appearance:  Well developed, well nourished    Ears:  Otoscopy of external auditory canals and tympanic membranes was normal, clinical speech reception thresholds grossly intact, no mass/lesion of auricle.    Nose:  No masses/lesions of external nose, nasal mucosa, septum, and turbinates were within normal limits.    Mouth:  No mass/lesion of lips, teeth, gums, hard/soft palate, tongue, tonsils, or oropharynx.    Neck & Lymphatics:  No cervical lymphadenopathy, no neck mass/crepitus/ asymmetry, trachea is midline, no thyroid enlargement/tenderness/mass.        [x]  Data Reviewed:    No results found for: "WBC", "HGB", "HCT", "MCV", "LABPLAT", "EOSINOPHIL"          Assessment & Plan:   History of oral aphthous ulcers        Normal ENT exam today.  Recommended follow-up as needed if her symptoms return    "

## 2023-10-14 ENCOUNTER — OFFICE VISIT (OUTPATIENT)
Dept: URGENT CARE | Facility: CLINIC | Age: 33
End: 2023-10-14
Payer: COMMERCIAL

## 2023-10-14 VITALS
WEIGHT: 293 LBS | BODY MASS INDEX: 39.68 KG/M2 | RESPIRATION RATE: 19 BRPM | SYSTOLIC BLOOD PRESSURE: 143 MMHG | TEMPERATURE: 98 F | HEART RATE: 89 BPM | OXYGEN SATURATION: 100 % | HEIGHT: 72 IN | DIASTOLIC BLOOD PRESSURE: 74 MMHG

## 2023-10-14 DIAGNOSIS — Z11.3 SCREEN FOR STD (SEXUALLY TRANSMITTED DISEASE): Primary | ICD-10-CM

## 2023-10-14 LAB — HIV 1+2 AB+HIV1 P24 AG SERPL QL IA: NORMAL

## 2023-10-14 PROCEDURE — 99213 OFFICE O/P EST LOW 20 MIN: CPT | Mod: S$GLB,,, | Performed by: PHYSICIAN ASSISTANT

## 2023-10-14 PROCEDURE — 87389 HIV-1 AG W/HIV-1&-2 AB AG IA: CPT | Performed by: PHYSICIAN ASSISTANT

## 2023-10-14 PROCEDURE — 99213 PR OFFICE/OUTPT VISIT, EST, LEVL III, 20-29 MIN: ICD-10-PCS | Mod: S$GLB,,, | Performed by: PHYSICIAN ASSISTANT

## 2023-10-14 RX ORDER — EMTRICITABINE AND TENOFOVIR DISOPROXIL FUMARATE 200; 300 MG/1; MG/1
1 TABLET, FILM COATED ORAL DAILY
Qty: 30 TABLET | Refills: 0 | Status: SHIPPED | OUTPATIENT
Start: 2023-10-14 | End: 2023-11-13

## 2023-10-14 RX ORDER — DOLUTEGRAVIR SODIUM 50 MG/1
50 TABLET, FILM COATED ORAL DAILY
Qty: 30 TABLET | Refills: 0 | Status: SHIPPED | OUTPATIENT
Start: 2023-10-14 | End: 2023-11-13
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
## 2023-10-17 ENCOUNTER — TELEPHONE (OUTPATIENT)
Dept: URGENT CARE | Facility: CLINIC | Age: 33
End: 2023-10-17
Payer: COMMERCIAL

## 2023-11-02 ENCOUNTER — OFFICE VISIT (OUTPATIENT)
Dept: OTOLARYNGOLOGY | Facility: CLINIC | Age: 33
End: 2023-11-02
Payer: COMMERCIAL

## 2023-11-02 VITALS — BODY MASS INDEX: 41.26 KG/M2 | WEIGHT: 293 LBS

## 2023-11-02 DIAGNOSIS — K14.8 TONGUE LESION: Primary | ICD-10-CM

## 2023-11-02 PROCEDURE — 99999 PR PBB SHADOW E&M-EST. PATIENT-LVL III: CPT | Mod: PBBFAC,,, | Performed by: OTOLARYNGOLOGY

## 2023-11-02 PROCEDURE — 99999 PR PBB SHADOW E&M-EST. PATIENT-LVL III: ICD-10-PCS | Mod: PBBFAC,,, | Performed by: OTOLARYNGOLOGY

## 2023-11-02 PROCEDURE — 99213 PR OFFICE/OUTPT VISIT, EST, LEVL III, 20-29 MIN: ICD-10-PCS | Mod: S$GLB,,, | Performed by: OTOLARYNGOLOGY

## 2023-11-02 PROCEDURE — 1159F MED LIST DOCD IN RCRD: CPT | Mod: CPTII,S$GLB,, | Performed by: OTOLARYNGOLOGY

## 2023-11-02 PROCEDURE — 3008F PR BODY MASS INDEX (BMI) DOCUMENTED: ICD-10-PCS | Mod: CPTII,S$GLB,, | Performed by: OTOLARYNGOLOGY

## 2023-11-02 PROCEDURE — 3008F BODY MASS INDEX DOCD: CPT | Mod: CPTII,S$GLB,, | Performed by: OTOLARYNGOLOGY

## 2023-11-02 PROCEDURE — 1159F PR MEDICATION LIST DOCUMENTED IN MEDICAL RECORD: ICD-10-PCS | Mod: CPTII,S$GLB,, | Performed by: OTOLARYNGOLOGY

## 2023-11-02 PROCEDURE — 99213 OFFICE O/P EST LOW 20 MIN: CPT | Mod: S$GLB,,, | Performed by: OTOLARYNGOLOGY

## 2023-11-02 NOTE — PROGRESS NOTES
"Referring Provider:    No referring provider defined for this encounter.  Subjective:   Patient: Drew Livingston [id number], :1990   Visit date:2023 10:50 AM    Chief Complaint:  Other (Bump in middle of tongue , no pain or anything. Says last time he came it was there and went away and now it is back. Been there for over 2 weeks now. Noticed that it has gotten smaller. Nothing draining from  it )    HPI:    Prior notes reviewed by myself.  Clinical documentation obtained by nursing staff reviewed.     32-year-old female presents for evaluation of an abnormality she has noticed on her dorsal tongue.  She states that she has seen this appear in the past and at that time it resolved spontaneously.  She is not having any significant pain or other symptoms from this.  She just wanted to see an ENT to have it evaluated.      Objective:     Physical Exam:  Vitals:  Wt (!) 139.7 kg (307 lb 15.7 oz)   BMI 41.26 kg/m²   General appearance:  Well developed, well nourished    Ears:  Otoscopy of external auditory canals and tympanic membranes was normal, clinical speech reception thresholds grossly intact, no mass/lesion of auricle.    Nose:  No masses/lesions of external nose, nasal mucosa, septum, and turbinates were within normal limits.    Mouth:  No mass/lesion of lips, teeth, gums, hard/soft palate, tonsils, oropharynx.  Small fibrotic area of her central tongue near midline at junction of anterior 2/3 and posterior1/3, no palpable mass or submucosal lesion.      Neck & Lymphatics:  No cervical lymphadenopathy, no neck mass/crepitus/ asymmetry, trachea is midline, no thyroid enlargement/tenderness/mass.        [x]  Data Reviewed:    No results found for: "WBC", "HGB", "HCT", "MCV", "LABPLAT", "EOSINOPHIL"            Assessment & Plan:   Tongue lesion        She has a small fibrotic or keratinized area of her midline tongue near the junction of the anterior 2/3 and posterior 1/3.  I do not see any mucosal " changes that are suspicious for malignancy or other pathology.  We agreed to continue with observation for now and she will follow-up in 3 months or sooner as needed.

## 2024-08-03 ENCOUNTER — OFFICE VISIT (OUTPATIENT)
Dept: URGENT CARE | Facility: CLINIC | Age: 34
End: 2024-08-03
Payer: COMMERCIAL

## 2024-08-03 VITALS
RESPIRATION RATE: 16 BRPM | SYSTOLIC BLOOD PRESSURE: 127 MMHG | HEART RATE: 72 BPM | TEMPERATURE: 98 F | DIASTOLIC BLOOD PRESSURE: 77 MMHG | OXYGEN SATURATION: 100 %

## 2024-08-03 DIAGNOSIS — L73.9 FOLLICULITIS: Primary | ICD-10-CM

## 2024-08-03 PROCEDURE — 99213 OFFICE O/P EST LOW 20 MIN: CPT | Mod: S$GLB,,, | Performed by: NURSE PRACTITIONER

## 2024-08-03 RX ORDER — MUPIROCIN 20 MG/G
OINTMENT TOPICAL 3 TIMES DAILY
Qty: 30 G | Refills: 1 | Status: SHIPPED | OUTPATIENT
Start: 2024-08-03 | End: 2024-08-10

## 2024-08-03 RX ORDER — DOXYCYCLINE 100 MG/1
100 CAPSULE ORAL 2 TIMES DAILY
Qty: 14 CAPSULE | Refills: 0 | Status: SHIPPED | OUTPATIENT
Start: 2024-08-03 | End: 2024-08-10

## 2024-08-03 RX ORDER — EMTRICITABINE AND TENOFOVIR ALAFENAMIDE 200; 25 MG/1; MG/1
1 TABLET ORAL
COMMUNITY
Start: 2024-07-24

## 2024-08-03 RX ORDER — TRIAMCINOLONE ACETONIDE 5 MG/G
CREAM TOPICAL 2 TIMES DAILY PRN
Qty: 15 G | Refills: 2 | Status: SHIPPED | OUTPATIENT
Start: 2024-08-03 | End: 2024-08-17

## 2024-08-03 NOTE — PATIENT INSTRUCTIONS
Consider alternative hair removal ie waxing; otherwise if resume shaving, shave in one direction. Gently exfoliate skin when noted hairs are growing in to avoid entrapped follicles leading to local inflammation; If inflammation noted early apply steroid cream and monitor area.  Follow up to UC or Derm as needed      Avoid scratching shaving  area  until healed   Avoid harsh soaps,detergents, deodorants in the area of complaint   Pat skin dry   May use steroid cream as needed up to 2 times a day x 1 week for inflammation/itching; Do not apply large amount as it may promote fungal growth   General rule: Apply creams before ointment   Oral antibiotic as prescribed;   Topical antibiotic ointment to aid in healing and provide barrier between skin and clothing   Follow up in 2-3 days as needed     Monitor area if redness expands  follow up to [facility] or PCP

## 2024-08-03 NOTE — PROGRESS NOTES
Subjective:      Patient ID: Drew Livingston is a 33 y.o. female.    Vitals:  temperature is 98 °F (36.7 °C). Her blood pressure is 127/77 and her pulse is 72. Her respiration is 16 and oxygen saturation is 100%.     Chief Complaint: Abscess    PT presents today with with abscess located in her private area. She states that it did drain but she just wants some antibiotics to help it heal. PT states that it has been coming and for a couple months now and started draining on Thursday. PT has been applying warm compress and cleaning w/ peroxide.    Abscess  Chronicity:  Recurrent  Onset:  7 days or greater  Progression Since Onset: improving      Skin:  Positive for abscess. Negative for erythema.      Objective:     Vitals:    08/03/24 1006   BP: 127/77   BP Location: Left arm   Patient Position: Sitting   BP Method: Large (Automatic)   Pulse: 72   Resp: 16   Temp: 98 °F (36.7 °C)   SpO2: 100%       Physical Exam   Constitutional: She is oriented to person, place, and time. She appears well-developed.   HENT:   Head: Normocephalic and atraumatic. Head is without abrasion, without contusion and without laceration.   Ears:   Right Ear: External ear normal.   Left Ear: External ear normal.   Nose: Nose normal.   Mouth/Throat: Oropharynx is clear and moist and mucous membranes are normal.   Eyes: Conjunctivae, EOM and lids are normal. Pupils are equal, round, and reactive to light.   Neck: Trachea normal and phonation normal. Neck supple.   Cardiovascular: Normal rate, regular rhythm and normal heart sounds.   Pulmonary/Chest: Effort normal and breath sounds normal. No stridor. No respiratory distress.   Musculoskeletal: Normal range of motion.         General: Normal range of motion.   Neurological: She is alert and oriented to person, place, and time.   Skin: Skin is warm and no rash. Capillary refill takes less than 2 seconds. No abrasion, No burn, No bruising, No erythema and No ecchymosis         Comments: R upper  mons pubis + area of post follicular inflammation and acute central trapped hair follicle; thickening of skin in proximally but non fluctuant    Psychiatric: Her speech is normal and behavior is normal. Judgment and thought content normal.   Nursing note and vitals reviewed.      Assessment:     1. Folliculitis        Plan:     Patient stable for discharge and home management of condition    Folliculitis  -     doxycycline (VIBRAMYCIN) 100 MG Cap; Take 1 capsule (100 mg total) by mouth 2 (two) times daily. Take with meal for 7 days  Dispense: 14 capsule; Refill: 0  -     triamcinolone acetonide 0.5% (KENALOG) 0.5 % Crea; Apply topically 2 (two) times daily as needed (inflammed follicles, pruritic insect bites). Never apply to face  Dispense: 15 g; Refill: 2  -     mupirocin (BACTROBAN) 2 % ointment; Apply topically 3 (three) times daily. Apply after creams   Apply until healed for 7 days  Dispense: 30 g; Refill: 1      Patient Instructions     Consider alternative hair removal ie waxing; otherwise if resume shaving, shave in one direction. Gently exfoliate skin when noted hairs are growing in to avoid entrapped follicles leading to local inflammation; If inflammation noted early apply steroid cream and monitor area.  Follow up to UC or Derm as needed      Avoid scratching shaving  area  until healed   Avoid harsh soaps,detergents, deodorants in the area of complaint   Pat skin dry   May use steroid cream as needed up to 2 times a day x 1 week for inflammation/itching; Do not apply large amount as it may promote fungal growth   General rule: Apply creams before ointment   Oral antibiotic as prescribed;   Topical antibiotic ointment to aid in healing and provide barrier between skin and clothing   Follow up in 2-3 days as needed     Monitor area if redness expands  follow up to [facility] or PCP          No follow-ups on file.

## 2024-11-26 ENCOUNTER — HOSPITAL ENCOUNTER (EMERGENCY)
Facility: HOSPITAL | Age: 34
Discharge: HOME | End: 2024-11-26
Attending: EMERGENCY MEDICINE
Payer: COMMERCIAL

## 2024-11-26 VITALS
HEART RATE: 71 BPM | RESPIRATION RATE: 18 BRPM | BODY MASS INDEX: 43.27 KG/M2 | DIASTOLIC BLOOD PRESSURE: 81 MMHG | WEIGHT: 293 LBS | OXYGEN SATURATION: 98 % | SYSTOLIC BLOOD PRESSURE: 138 MMHG | TEMPERATURE: 98 F

## 2024-11-26 DIAGNOSIS — S91.209A TRAUMATIC AVULSION OF NAIL PLATE OF TOE, INITIAL ENCOUNTER: Primary | ICD-10-CM

## 2024-11-26 DIAGNOSIS — M79.674 GREAT TOE PAIN, RIGHT: ICD-10-CM

## 2024-11-26 PROCEDURE — 25000003 PHARM REV CODE 250

## 2024-11-26 PROCEDURE — 99284 EMERGENCY DEPT VISIT MOD MDM: CPT | Mod: 25

## 2024-11-26 RX ORDER — BACITRACIN ZINC, NEOMYCIN SULFATE, AND POLYMYXIN B SULFATE 400; 3.5; 5 [IU]/G; MG/G; [IU]/G
OINTMENT TOPICAL 2 TIMES DAILY
Qty: 28 G | Refills: 0 | Status: SHIPPED | OUTPATIENT
Start: 2024-11-26 | End: 2024-12-01

## 2024-11-26 RX ORDER — CEPHALEXIN 500 MG/1
500 CAPSULE ORAL 4 TIMES DAILY
Qty: 20 CAPSULE | Refills: 0 | Status: SHIPPED | OUTPATIENT
Start: 2024-11-26 | End: 2024-12-01

## 2024-11-26 RX ADMIN — BACITRACIN ZINC, NEOMYCIN, POLYMYXIN B 1 EACH: 400; 3.5; 5 OINTMENT TOPICAL at 11:11

## 2024-11-26 NOTE — Clinical Note
"Drew"Wiliam Livingston was seen and treated in our emergency department on 11/26/2024.  She may return to work on 11/29/2024.       If you have any questions or concerns, please don't hesitate to call.      Michael Javier, NP"

## 2024-11-27 NOTE — ED PROVIDER NOTES
Encounter Date: 11/26/2024       History     Chief Complaint   Patient presents with    Toe Pain     Right great toe nail pain onset today after cracking nail and tearing nailbed; bleeding controlled     34-year-old female presents to the emergency department chief complaint of right great toe injury.  Reports that she hit her toe in the end of the couch while moving it today at her home.  Reports initially bleeding.  Reports that the toenail is split.  She denies any pain currently. She denies any numbness, tingling, deformity, difficulty with ambulation, swelling, or any other symptoms.        Review of patient's allergies indicates:  No Known Allergies  History reviewed. No pertinent past medical history.  History reviewed. No pertinent surgical history.  Family History   Problem Relation Name Age of Onset    No Known Problems Mother      No Known Problems Father       Social History     Tobacco Use    Smoking status: Never    Smokeless tobacco: Never   Substance Use Topics    Alcohol use: Not Currently    Drug use: Not Currently     Review of Systems   Constitutional:  Negative for chills and fever.   HENT:  Negative for sore throat.    Respiratory:  Negative for shortness of breath.    Cardiovascular:  Negative for chest pain.   Gastrointestinal:  Negative for abdominal pain and nausea.   Genitourinary:  Negative for dysuria.   Musculoskeletal:  Negative for back pain.   Skin:  Positive for wound. Negative for rash.   Neurological:  Negative for weakness and numbness.   Hematological:  Does not bruise/bleed easily.       Physical Exam     Initial Vitals [11/26/24 2157]   BP Pulse Resp Temp SpO2   (!) 144/80 76 16 97.9 °F (36.6 °C) 97 %      MAP       --       ED Course Vitals  Vitals:    11/26/24 2157 11/26/24 2350   BP: (!) 144/80 138/81   BP Location: Right arm    Pulse: 76 71   Resp: 16 18   Temp: 97.9 °F (36.6 °C) 98.1 °F (36.7 °C)   TempSrc: Oral Oral   SpO2: 97% 98%   Weight: (!) 146.5 kg (323 lb)         Physical Exam    Nursing note and vitals reviewed.  Constitutional: She appears well-developed and well-nourished.  Non-toxic appearance. She does not have a sickly appearance. She does not appear ill. No distress.   HENT:   Head: Normocephalic and atraumatic.   Right Ear: Hearing normal.   Left Ear: Hearing normal.   Nose: Nose normal. Mouth/Throat: Uvula is midline and oropharynx is clear and moist.   Eyes: Conjunctivae, EOM and lids are normal. Pupils are equal, round, and reactive to light.   Neck: Trachea normal. Neck supple.   Normal range of motion.   Full passive range of motion without pain.     Cardiovascular:  Normal rate, regular rhythm, normal heart sounds, intact distal pulses and normal pulses.           Pulmonary/Chest: Effort normal and breath sounds normal.   Abdominal: Abdomen is soft. Bowel sounds are normal. There is no abdominal tenderness. There is no rebound and no guarding.   Musculoskeletal:         General: Normal range of motion.      Cervical back: Normal, full passive range of motion without pain, normal range of motion and neck supple.      Comments: Right great toe nail injury.  Partial avulsion of the distal nail plate.  No laceration or injury noted to the nailbed.  No obvious deformity, bruising, swelling.  Dried blood noted to the distal aspect of the nailbed.     Neurological: She is alert and oriented to person, place, and time. She has normal strength and normal reflexes. No cranial nerve deficit or sensory deficit. GCS eye subscore is 4. GCS verbal subscore is 5. GCS motor subscore is 6.   Skin: Skin is warm and dry.   Psychiatric: She has a normal mood and affect. Her behavior is normal. Thought content normal.         ED Course   Procedures  Labs Reviewed - No data to display       Imaging Results              X-Ray Toe 2 or More Views Right (Final result)  Result time 11/26/24 23:20:33      Final result by Diaz Ga MD (11/26/24 23:20:33)                    Impression:      No radiographic evidence of acute displaced fracture or dislocation.      Electronically signed by: Diaz Ga MD  Date:    11/26/2024  Time:    23:20               Narrative:    EXAMINATION:  XR TOE 2 OR MORE VIEWS RIGHT    CLINICAL HISTORY:  right great toe injury;    TECHNIQUE:  Three views of the right toes were performed    COMPARISON:  None    FINDINGS:  No radiographic evidence of acute displaced fracture or dislocation of the visualized right toes.  Joint spaces appear well maintained.  No aggressive cortical destruction or periosteal reaction identified.  No retained radiopaque foreign body identified.                                       Medications   Tdap (BOOSTRIX) vaccine injection 0.5 mL (0.5 mLs Intramuscular Not Given 11/27/24 0045)   neomycin-bacitracnZn-polymyxnB packet 1 each (1 each Topical (Top) Given 11/26/24 8724)     Medical Decision Making  Reassessed pt at this time. Discussed with pt all pertinent ED information and results. Discussed pt dx and plan of tx. Gave pt all f/u and return to the ED instructions. All questions and concerns were addressed at this time. Pt expresses understanding of information and instructions, and is comfortable with plan to discharge. Pt is stable for discharge.    Amount and/or Complexity of Data Reviewed  Radiology: ordered.    Risk  OTC drugs.  Prescription drug management.                                      Clinical Impression:  Final diagnoses:  [M79.674] Great toe pain, right  [S91.209A] Traumatic avulsion of nail plate of toe, initial encounter (Primary)          ED Disposition Condition    Discharge Stable          ED Prescriptions       Medication Sig Dispense Start Date End Date Auth. Provider    neomycin-bacitracin-polymyxin (NEOSPORIN, [id number],) ointment Apply topically 2 (two) times daily. for 5 days 28 g 11/26/2024 12/1/2024 Michael Javier, NP    cephALEXin (KEFLEX) 500 MG capsule Take 1 capsule (500 mg total) by  mouth 4 (four) times daily. for 5 days 20 capsule 11/26/2024 12/1/2024 Michael Javier NP          Follow-up Information       Follow up With Specialties Details Why Contact Info    [facility] - Emergency Dept. Emergency Medicine Go to  As needed, If symptoms worsen [id number] [place] [address] [id number] [phone]    [facility] Podiatry Schedule an appointment as soon as possible for a visit   [address] [place] [id number] [phone]             Michael Javier NP  11/27/24 0102

## 2024-12-26 ENCOUNTER — OFFICE VISIT (OUTPATIENT)
Dept: URGENT CARE | Facility: CLINIC | Age: 34
End: 2024-12-26
Payer: COMMERCIAL

## 2024-12-26 VITALS
HEIGHT: 71 IN | OXYGEN SATURATION: 97 % | TEMPERATURE: 100 F | RESPIRATION RATE: 18 BRPM | DIASTOLIC BLOOD PRESSURE: 74 MMHG | BODY MASS INDEX: 41.02 KG/M2 | SYSTOLIC BLOOD PRESSURE: 127 MMHG | HEART RATE: 110 BPM | WEIGHT: 293 LBS

## 2024-12-26 DIAGNOSIS — R05.9 COUGH, UNSPECIFIED TYPE: ICD-10-CM

## 2024-12-26 DIAGNOSIS — R50.9 FEVER, UNSPECIFIED FEVER CAUSE: ICD-10-CM

## 2024-12-26 DIAGNOSIS — R68.83 CHILLS: ICD-10-CM

## 2024-12-26 DIAGNOSIS — J10.1 INFLUENZA A: Primary | ICD-10-CM

## 2024-12-26 DIAGNOSIS — R53.83 FATIGUE, UNSPECIFIED TYPE: ICD-10-CM

## 2024-12-26 LAB
CTP QC/QA: YES
CTP QC/QA: YES
POC MOLECULAR INFLUENZA A AGN: POSITIVE
POC MOLECULAR INFLUENZA B AGN: NEGATIVE
SARS-COV-2 AG RESP QL IA.RAPID: NEGATIVE

## 2024-12-26 RX ORDER — PROGESTERONE 100 MG/1
CAPSULE ORAL
COMMUNITY
Start: 2024-12-20

## 2024-12-26 NOTE — PROGRESS NOTES
"Subjective:      Patient ID: Drew Livingston is a 34 y.o. female.    Vitals:  height is 5' 10.71" (1.796 m) and weight is 144.7 kg (318 lb 14.3 oz) (abnormal). Her tympanic temperature is 99.9 °F (37.7 °C). Her blood pressure is 127/74 and her pulse is 110. Her respiration is 18 and oxygen saturation is 97%.     Chief Complaint: Sinus Problem    34 year old female presents for evaluation of cough and fatigue x 2 days. Symptom onset Tuesday, started throughout the day. Doxcycline x 2 pills (1 pill daily x 2 days) post intercourse for STI prophylaxis. Reports symptom onset post administration. No known sick contacts. OTC Theraflu with some relief.     Sinus Problem  This is a new problem. The current episode started in the past 7 days (2). The problem has been gradually worsening since onset. There has been no fever. Associated symptoms include chills, congestion, coughing, diaphoresis, headaches and sneezing. Pertinent negatives include no ear pain, shortness of breath, sinus pressure or sore throat. Treatments tried: Theraflu. The treatment provided mild relief.       Constitution: Positive for appetite change, chills, sweating, fatigue and fever (subjective).   HENT:  Positive for congestion. Negative for ear pain, postnasal drip, sinus pain, sinus pressure and sore throat.    Neck: neck negative.   Cardiovascular: Negative.    Eyes: Negative.    Respiratory:  Positive for cough and sputum production. Negative for shortness of breath, wheezing and asthma.    Gastrointestinal:  Positive for diarrhea. Negative for nausea and vomiting.   Endocrine: negative.   Genitourinary: Negative.    Musculoskeletal: Negative.  Negative for muscle ache.   Skin: Negative.    Allergic/Immunologic: Positive for sneezing. Negative for asthma.   Neurological:  Positive for headaches.   Hematologic/Lymphatic: Negative.    Psychiatric/Behavioral: Negative.        Objective:     Physical Exam   Constitutional: She is oriented to " person, place, and time. She appears well-developed. She is cooperative.  Non-toxic appearance. She appears ill. No distress. She is not intubated. awake  HENT:   Head: Normocephalic and atraumatic.   Ears:   Right Ear: Hearing, tympanic membrane, external ear and ear canal normal. Tympanic membrane is not injected, not scarred, not perforated, not erythematous, not retracted and not bulging. no impacted cerumen  Left Ear: Hearing, tympanic membrane, external ear and ear canal normal. Tympanic membrane is not injected, not scarred, not perforated, not erythematous, not retracted and not bulging. no impacted cerumen  Nose: Mucosal edema, rhinorrhea and congestion present. No nasal deformity. No epistaxis. Right sinus exhibits no maxillary sinus tenderness and no frontal sinus tenderness. Left sinus exhibits no maxillary sinus tenderness and no frontal sinus tenderness.   Mouth/Throat: Uvula is midline, oropharynx is clear and moist and mucous membranes are normal. Mucous membranes are moist. No trismus in the jaw. Normal dentition. No uvula swelling. No oropharyngeal exudate, posterior oropharyngeal edema, posterior oropharyngeal erythema, tonsillar abscesses or cobblestoning. Tonsils are 0 on the right. Tonsils are 0 on the left. No tonsillar exudate.   Eyes: Conjunctivae and lids are normal. Pupils are equal, round, and reactive to light. Right eye exhibits no discharge. Left eye exhibits no discharge. No scleral icterus.   Neck: Trachea normal and phonation normal. Neck supple. No edema present. No erythema present. No neck rigidity present.   Cardiovascular: Normal rate, regular rhythm, normal heart sounds and normal pulses.   Pulmonary/Chest: Effort normal and breath sounds normal. No accessory muscle usage or stridor. No apnea, no tachypnea and no bradypnea. She is not intubated. No respiratory distress. She has no decreased breath sounds. She has no wheezes. She has no rhonchi. She has no rales. She exhibits  no tenderness.   Abdominal: Normal appearance.   Musculoskeletal: Normal range of motion.         General: No deformity. Normal range of motion.   Neurological: no focal deficit. She is alert and oriented to person, place, and time. She exhibits normal muscle tone. Coordination normal.   Skin: Skin is warm, dry, intact, not diaphoretic and not pale.   Psychiatric: Her speech is normal and behavior is normal. Mood, judgment and thought content normal.   Nursing note and vitals reviewed.    Results for orders placed or performed in visit on 12/26/24   POCT Influenza A/B MOLECULAR    Collection Time: 12/26/24 10:33 AM   Result Value Ref Range    POC Molecular Influenza A Ag Positive (A) Negative    POC Molecular Influenza B Ag Negative Negative     Acceptable Yes    SARS Coronavirus 2 Antigen, POCT Manual Read    Collection Time: 12/26/24 10:40 AM   Result Value Ref Range    SARS Coronavirus 2 Antigen Negative Negative     Acceptable Yes        Assessment:     1. Influenza A    2. Cough, unspecified type    3. Chills    4. Fatigue, unspecified type    5. Fever, unspecified fever cause        Plan:       Influenza A    Cough, unspecified type  -     POCT Influenza A/B MOLECULAR  -     SARS Coronavirus 2 Antigen, POCT Manual Read    Chills    Fatigue, unspecified type    Fever, unspecified fever cause        Patient presents with symptoms and examination that are consistent with acute viral illness. (+) Flu A/(-)Flu B/(-)Covid results discussed. Patient is not a candidate of antiviral treatment: outside of 48 hour treatment window. Exam negative for otitis media/bacterial sinusitis/bacterial tonsillitis/bronchitis/pneumonia. Plan is to manage symptoms, prevent worsening, and follow up as needed/with worsening. Discussed with patient who verbalizes understanding.      Patient Instructions   You may resume normal activities when you have been fever free x 24 hours without the use of fever  reducing medications  Rest  Hydration/increase fluids  Oral rehydration solutions: liquid IV, body armor, Gatorade  Avoid fried/fatty/greasy/heavy meals  Emergen C OTC as directed for immune support  Continue OTC medications as directed for symptom management  Typical course and duration of illness discussed  Signs and symptoms of worsening discussed  Follow up as needed/with worsening

## 2024-12-26 NOTE — PATIENT INSTRUCTIONS
You may resume normal activities when you have been fever free x 24 hours without the use of fever reducing medications  Rest  Hydration/increase fluids  Oral rehydration solutions: liquid IV, body armor, Gatorade  Avoid fried/fatty/greasy/heavy meals  Emergen C OTC as directed for immune support  Continue OTC medications as directed for symptom management  Typical course and duration of illness discussed  Signs and symptoms of worsening discussed  Follow up as needed/with worsening

## 2025-01-28 ENCOUNTER — OFFICE VISIT (OUTPATIENT)
Dept: URGENT CARE | Facility: CLINIC | Age: 35
End: 2025-01-28
Payer: COMMERCIAL

## 2025-01-28 VITALS
HEART RATE: 107 BPM | OXYGEN SATURATION: 97 % | WEIGHT: 293 LBS | BODY MASS INDEX: 43.4 KG/M2 | RESPIRATION RATE: 18 BRPM | SYSTOLIC BLOOD PRESSURE: 119 MMHG | TEMPERATURE: 99 F | DIASTOLIC BLOOD PRESSURE: 72 MMHG | HEIGHT: 69 IN

## 2025-01-28 DIAGNOSIS — J02.0 STREP PHARYNGITIS: Primary | ICD-10-CM

## 2025-01-28 DIAGNOSIS — J02.9 SORE THROAT: ICD-10-CM

## 2025-01-28 DIAGNOSIS — R50.9 SUBJECTIVE FEVER: ICD-10-CM

## 2025-01-28 LAB
CTP QC/QA: YES
MOLECULAR STREP A: POSITIVE

## 2025-01-28 PROCEDURE — 99213 OFFICE O/P EST LOW 20 MIN: CPT | Mod: S$GLB,,,

## 2025-01-28 PROCEDURE — 87651 STREP A DNA AMP PROBE: CPT | Mod: QW,S$GLB,,

## 2025-01-28 RX ORDER — PENICILLIN V POTASSIUM 500 MG/1
500 TABLET, FILM COATED ORAL 2 TIMES DAILY
Qty: 20 TABLET | Refills: 0 | Status: SHIPPED | OUTPATIENT
Start: 2025-01-28 | End: 2025-02-07

## 2025-01-28 NOTE — PATIENT INSTRUCTIONS
Strep Pharyngitis     If your condition worsens or fails to improve we recommend that you receive another evaluation at the ER immediately or contact your PCP to discuss your concerns or return here. You must understand that you've received an urgent care treatment only and that you may be released before all your medical problems are known or treated. You the patient will arrange for followup care as instructed.   The majority of all sore throats or tonsillitis are viral and antibiotics will not treat this.     - Complete the full course of antibiotics given  - Get plenty of rest and Drink plenty of cool liquids while avoid any beverage or food that can irritate your throat (acidic, spicy or salty foods).  - Use throat spray as prescribed to cover sore throat symptoms or use diluted hydrogen peroxide  - Use over the counter Cepacol to soothe throat symptoms  - Tylenol or ibuprofen for pain may help as long as you are not allergic to these meds or have a medical condition such as stomach ulcers, liver or kidney disease or taking blood thinners etc that would prevent you from using these medications.   - Throw away your toothbrush now and when you complete your antibiotics.

## 2025-01-28 NOTE — PROGRESS NOTES
"Subjective:      Patient ID: Drew Livingston is a 34 y.o. female.    Vitals:  height is 5' 9" (1.753 m) and weight is 147.2 kg (324 lb 8.3 oz) (abnormal). Her tympanic temperature is 99.4 °F (37.4 °C). Her blood pressure is 119/72 and her pulse is 107. Her respiration is 18 and oxygen saturation is 97%.     Chief Complaint: Sore Throat    35 yo Pt c/o sore throat, and possible fever onset Sunday, x 3 days ago. Pt has not treated symptoms with anything. States parent recommended she come here. No known sick contacts. States she had the flu last month. They deny any chills, body aches, runny nose, nasal congestion, ear pain, cough, trouble swallowing. NKDA.     Sore Throat   This is a new problem. The current episode started in the past 7 days. The pain is worse on the left side. The pain is at a severity of 10/10. The pain is moderate. Pertinent negatives include no abdominal pain, congestion, coughing, diarrhea, drooling, ear discharge, ear pain, headaches, hoarse voice, plugged ear sensation, neck pain, shortness of breath, stridor, swollen glands or vomiting. She has tried nothing for the symptoms. The treatment provided no relief.       Constitution: Positive for fever. Negative for chills.   HENT:  Positive for sore throat. Negative for ear pain, ear discharge, drooling, congestion and voice change.    Neck: Negative for neck pain and neck stiffness.   Eyes:  Negative for eye discharge, eye itching and eye redness.   Respiratory:  Negative for cough, shortness of breath and stridor.    Gastrointestinal:  Negative for abdominal pain, vomiting and diarrhea.   Skin:  Negative for rash.   Neurological:  Negative for headaches.      Objective:     Vitals:    01/28/25 1407   BP: 119/72   Pulse: 107   Resp: 18   Temp: 99.4 °F (37.4 °C)       Physical Exam   Constitutional: She is oriented to person, place, and time. She appears well-developed. She is cooperative.  Non-toxic appearance. She does not appear ill. No " distress.   HENT:   Head: Normocephalic and atraumatic.   Ears:   Right Ear: Hearing, tympanic membrane, external ear and ear canal normal. Tympanic membrane is not erythematous and not bulging. no impacted cerumen  Left Ear: Hearing, tympanic membrane, external ear and ear canal normal. Tympanic membrane is not erythematous and not bulging. no impacted cerumen  Nose: Nose normal. No mucosal edema, rhinorrhea or nasal deformity. No epistaxis. Right sinus exhibits no maxillary sinus tenderness and no frontal sinus tenderness. Left sinus exhibits no maxillary sinus tenderness and no frontal sinus tenderness.   Mouth/Throat: Uvula is midline, oropharynx is clear and moist and mucous membranes are normal. Mucous membranes are moist. No trismus in the jaw. Normal dentition. No uvula swelling. No oropharyngeal exudate, posterior oropharyngeal edema, posterior oropharyngeal erythema, tonsillar abscesses or cobblestoning. Tonsils are 2+ on the right. Tonsils are 2+ on the left. Tonsillar exudate.   Eyes: Conjunctivae and lids are normal. Pupils are equal, round, and reactive to light. Right eye exhibits no discharge. Left eye exhibits no discharge. No scleral icterus.   Neck: Trachea normal and phonation normal. Neck supple. No edema present. No erythema present. No neck rigidity present.   Cardiovascular: Normal rate, regular rhythm, normal heart sounds and normal pulses.   Pulmonary/Chest: Effort normal and breath sounds normal. No accessory muscle usage or stridor. No tachypnea. No respiratory distress. She has no decreased breath sounds. She has no wheezes. She has no rhonchi. She has no rales.   Abdominal: Normal appearance.   Musculoskeletal: Normal range of motion.         General: No deformity. Normal range of motion.   Neurological: She is alert and oriented to person, place, and time. She exhibits normal muscle tone. Coordination and gait normal.   Skin: Skin is warm, dry, intact, not diaphoretic, not pale and no  rash.   Psychiatric: Her speech is normal and behavior is normal. Judgment and thought content normal.   Nursing note and vitals reviewed.      Assessment:     1. Strep pharyngitis    2. Sore throat    3. Subjective fever      Results for orders placed or performed in visit on 01/28/25   POCT Strep A, Molecular    Collection Time: 01/28/25  2:20 PM   Result Value Ref Range    Molecular Strep A, POC Positive (A) Negative     Acceptable Yes        Plan:       Strep pharyngitis  -     penicillin v potassium (VEETID) 500 MG tablet; Take 1 tablet (500 mg total) by mouth 2 (two) times a day. for 10 days  Dispense: 20 tablet; Refill: 0    Sore throat  -     POCT Strep A, Molecular    Subjective fever        Patient Instructions                                                         Strep Pharyngitis     If your condition worsens or fails to improve we recommend that you receive another evaluation at the ER immediately or contact your PCP to discuss your concerns or return here. You must understand that you've received an urgent care treatment only and that you may be released before all your medical problems are known or treated. You the patient will arrange for followup care as instructed.   The majority of all sore throats or tonsillitis are viral and antibiotics will not treat this.     - Complete the full course of antibiotics given  - Get plenty of rest and Drink plenty of cool liquids while avoid any beverage or food that can irritate your throat (acidic, spicy or salty foods).  - Use throat spray as prescribed to cover sore throat symptoms or use diluted hydrogen peroxide  - Use over the counter Cepacol to soothe throat symptoms  - Tylenol or ibuprofen for pain may help as long as you are not allergic to these meds or have a medical condition such as stomach ulcers, liver or kidney disease or taking blood thinners etc that would prevent you from using these medications.   - Throw away your toothbrush  now and when you complete your antibiotics.      Medical Decision Making:   Clinical Tests:   Lab Tests: Reviewed and Ordered       <> Summary of Lab: Strep +  Urgent Care Management:  Reviewed positive strep test with patient who verbalized understanding.  We discussed treatment with an antibiotic to cover the strep throat infection as well as throat spray to cover sore throat symptoms.  We also discussed at home remedies to help with current symptoms and over-the-counter medications that can also help with diagnosis.  Patient educational handouts also included with discharge instructions for patient who verbalized understanding agrees with plan of care.  Patient denies any further questions or concerns at this time.  Patient exits exam room in no acute distress.